# Patient Record
Sex: MALE | Race: WHITE | Employment: FULL TIME | URBAN - METROPOLITAN AREA
[De-identification: names, ages, dates, MRNs, and addresses within clinical notes are randomized per-mention and may not be internally consistent; named-entity substitution may affect disease eponyms.]

---

## 2017-06-08 ENCOUNTER — ALLSCRIPTS OFFICE VISIT (OUTPATIENT)
Dept: OTHER | Facility: OTHER | Age: 27
End: 2017-06-08

## 2018-01-14 VITALS
BODY MASS INDEX: 26.18 KG/M2 | TEMPERATURE: 98.9 F | WEIGHT: 204 LBS | SYSTOLIC BLOOD PRESSURE: 128 MMHG | HEIGHT: 74 IN | DIASTOLIC BLOOD PRESSURE: 84 MMHG

## 2018-03-19 ENCOUNTER — OFFICE VISIT (OUTPATIENT)
Dept: FAMILY MEDICINE CLINIC | Facility: CLINIC | Age: 28
End: 2018-03-19
Payer: COMMERCIAL

## 2018-03-19 VITALS
BODY MASS INDEX: 27.46 KG/M2 | WEIGHT: 214 LBS | DIASTOLIC BLOOD PRESSURE: 76 MMHG | TEMPERATURE: 98.1 F | SYSTOLIC BLOOD PRESSURE: 110 MMHG | HEART RATE: 68 BPM | HEIGHT: 74 IN

## 2018-03-19 DIAGNOSIS — L42 PITYRIASIS ROSEA: ICD-10-CM

## 2018-03-19 DIAGNOSIS — L30.9 DERMATITIS: Primary | ICD-10-CM

## 2018-03-19 PROCEDURE — 99213 OFFICE O/P EST LOW 20 MIN: CPT | Performed by: FAMILY MEDICINE

## 2018-03-19 RX ORDER — DEXTROAMPHETAMINE SACCHARATE, AMPHETAMINE ASPARTATE MONOHYDRATE, DEXTROAMPHETAMINE SULFATE AND AMPHETAMINE SULFATE 2.5; 2.5; 2.5; 2.5 MG/1; MG/1; MG/1; MG/1
CAPSULE, EXTENDED RELEASE ORAL
COMMUNITY
Start: 2012-11-26 | End: 2020-07-15 | Stop reason: ALTCHOICE

## 2018-03-19 RX ORDER — PREDNISONE 10 MG/1
10 TABLET ORAL
Qty: 21 TABLET | Refills: 0 | Status: SHIPPED | OUTPATIENT
Start: 2018-03-19 | End: 2020-07-15 | Stop reason: ALTCHOICE

## 2018-03-19 NOTE — PROGRESS NOTES
Assessment/Plan:    Dermatitis  The patient was placed on prednisone 10 mg 1 tablet 3 times a day for 1 week and he was instructed to  Claritin 10 mg 1 daily  He may also use Benadryl 50 mg HS if needed  Pityriasis rosea  [place] patch located upper left abdomen       Diagnoses and all orders for this visit:    Dermatitis  -     predniSONE 10 mg tablet; Take 1 tablet (10 mg total) by mouth 3 (three) times daily after meals    Pityriasis rosea    Other orders  -     amphetamine-dextroamphetamine (ADDERALL XR) 10 MG 24 hr capsule; Take by mouth          Subjective:      Patient ID: Ghazal Mcgill is a 24 y o  male  CC:  Rash on torso - chest and back  Started Thursday  Does not itch or burn  -  lsh    HPI:  This is a 82-year-old male who comes in with a find circular erythematous rash located on his trunk and chest   He had a herald patch located on the left side of his upper abdomen  It is not itching or burning  And it is located on both sides of midline  He developed this 5 days ago  He has not used any medication for this  He denies having any kind of respiratory illness prior to the rash  His blood pressure is 110/76 and his temperature is 98 1°  The following portions of the patient's history were reviewed and updated as appropriate: allergies, current medications, past family history, past medical history, past social history, past surgical history and problem list     Review of Systems   Constitutional: Negative  HENT: Negative  Eyes: Negative  Respiratory: Negative  Cardiovascular: Negative  Gastrointestinal: Negative  Endocrine: Negative  Genitourinary: Negative  Musculoskeletal: Negative  Skin: Positive for rash  Allergic/Immunologic: Negative  Neurological: Negative  Hematological: Negative  Psychiatric/Behavioral: Negative            Vitals:    03/19/18 1430   BP: 110/76   BP Location: Left arm   Patient Position: Sitting   Cuff Size: Large Pulse: 68   Temp: 98 1 °F (36 7 °C)   TempSrc: Oral   Weight: 97 1 kg (214 lb)   Height: 6' 1 8" (1 875 m)   Objective:      /76 (BP Location: Left arm, Patient Position: Sitting, Cuff Size: Large)   Pulse 68   Temp 98 1 °F (36 7 °C) (Oral)   Ht 6' 1 8" (1 875 m)   Wt 97 1 kg (214 lb)   BMI 27 63 kg/m²          Physical Exam   Constitutional: He is oriented to person, place, and time  He appears well-developed and well-nourished  HENT:   Head: Normocephalic  Right Ear: External ear normal    Left Ear: External ear normal    Mouth/Throat: Oropharynx is clear and moist    Eyes: Conjunctivae and EOM are normal  Pupils are equal, round, and reactive to light  Neck: Normal range of motion  Neck supple  Cardiovascular: Normal rate, regular rhythm and normal heart sounds  Pulmonary/Chest: Effort normal and breath sounds normal    Abdominal: Soft  Bowel sounds are normal    Musculoskeletal: Normal range of motion  Neurological: He is alert and oriented to person, place, and time  Skin: Skin is warm  Rash noted  Circular rash located on his back and abdomen with a herald patch located left side of upper abdomen  Psychiatric: He has a normal mood and affect   His behavior is normal  Judgment and thought content normal

## 2018-03-19 NOTE — ASSESSMENT & PLAN NOTE
[place] patch located upper left abdomen  Patient was advised to watch his alcohol intake while on prednisone

## 2018-03-19 NOTE — ASSESSMENT & PLAN NOTE
The patient was placed on prednisone 10 mg 1 tablet 3 times a day for 1 week and he was instructed to  Claritin 10 mg 1 daily  He may also use Benadryl 50 mg HS if needed

## 2020-07-15 ENCOUNTER — OFFICE VISIT (OUTPATIENT)
Dept: FAMILY MEDICINE CLINIC | Facility: CLINIC | Age: 30
End: 2020-07-15
Payer: COMMERCIAL

## 2020-07-15 VITALS
DIASTOLIC BLOOD PRESSURE: 86 MMHG | RESPIRATION RATE: 18 BRPM | SYSTOLIC BLOOD PRESSURE: 138 MMHG | TEMPERATURE: 98 F | BODY MASS INDEX: 26.44 KG/M2 | WEIGHT: 206 LBS | HEIGHT: 74 IN

## 2020-07-15 DIAGNOSIS — R59.0 POSTERIOR AURICULAR LYMPHADENOPATHY: ICD-10-CM

## 2020-07-15 DIAGNOSIS — Z00.00 HEALTH CARE MAINTENANCE: Primary | ICD-10-CM

## 2020-07-15 DIAGNOSIS — Z20.828 EXPOSURE TO SARS-ASSOCIATED CORONAVIRUS: ICD-10-CM

## 2020-07-15 PROCEDURE — 99395 PREV VISIT EST AGE 18-39: CPT | Performed by: PHYSICIAN ASSISTANT

## 2020-07-15 PROCEDURE — 3008F BODY MASS INDEX DOCD: CPT | Performed by: PHYSICIAN ASSISTANT

## 2020-07-15 NOTE — PATIENT INSTRUCTIONS
Assessment/plan:  1  Health maintenance-healthy appearing 72-year-old gentleman  Recommend routine screening blood work  Patient declines STD testing  Will follow-up with labs as needed  2  Left postauricular mass-possibly adenopathy  This has been persistent and enlarging  Recommend ultrasound to try to determine consistency  3  Diarrhea-patient has had some diarrhea after a wedding that he was to recently  His girlfriend also has symptoms and they will be visiting somebody with health conditions that they want to get COVID testing  Swab will be ordered at [facility]  Patient was cautioned this may take 5-7 days for a result

## 2020-07-15 NOTE — PROGRESS NOTES
Assessment and Plan:  Patient Instructions   Assessment/plan:  1  Health maintenance-healthy appearing 60-year-old gentleman  Recommend routine screening blood work  Patient declines STD testing  Will follow-up with labs as needed  2  Left postauricular mass-possibly adenopathy  This has been persistent and enlarging  Recommend ultrasound to try to determine consistency  3  Diarrhea-patient has had some diarrhea after a wedding that he was to recently  His girlfriend also has symptoms and they will be visiting somebody with health conditions that they want to get COVID testing  Swab will be ordered at [facility]  Patient was cautioned this may take 5-7 days for a result  Problem List Items Addressed This Visit     None      Visit Diagnoses     Health care maintenance    -  Primary    Relevant Orders    CBC and differential    Comprehensive metabolic panel    Lipid Panel with Direct LDL reflex    TSH, 3rd generation with Free T4 reflex    Exposure to SARS-associated coronavirus        Relevant Orders    MISC COVID-19 TEST- Collected at [facility]    Posterior auricular lymphadenopathy        Relevant Orders    US head neck soft tissue                 Diagnoses and all orders for this visit:    Health care maintenance  -     CBC and differential  -     Comprehensive metabolic panel  -     Lipid Panel with Direct LDL reflex  -     TSH, 3rd generation with Free T4 reflex    Exposure to SARS-associated coronavirus  -     MISC COVID-19 TEST- Collected at [facility]; Future    Posterior auricular lymphadenopathy  -     US head neck soft tissue; Future              Subjective:      Patient ID: Ginna Sears is a 31 y o  male  CC:    Chief Complaint   Patient presents with    Annual Exam     check up       HPI:    HPI:  This is a 60-year-old gentleman that presents to the office for general health maintenance check    He has been feeling well for the most part but he does mention that he recently was at a wedding and has had some diarrhea shortly after  He does not have any other symptoms present  His girlfriend and he will be visiting somebody with sickle cell disease in the near future and he wants to make sure that he is not COVID positive prior to visiting them  He is also interested in getting routine blood work and he does mention a mass behind his left ear which has been persistent for over a year  It seems to be getting larger in time  He has occasionally had some pains associated with it  The following portions of the patient's history were reviewed and updated as appropriate: allergies, current medications, past family history, past medical history, past social history, past surgical history and problem list       Review of Systems   Constitutional: Negative for chills, fatigue and fever  HENT: Negative for congestion, ear pain and sinus pressure  Eyes: Negative for visual disturbance  Respiratory: Negative for cough, chest tightness and shortness of breath  Cardiovascular: Negative for chest pain and palpitations  Gastrointestinal: Positive for diarrhea  Negative for nausea and vomiting  Endocrine: Negative for polyuria  Genitourinary: Negative for dysuria and frequency  Musculoskeletal: Negative for arthralgias and myalgias  Skin: Negative for pallor and rash  Neurological: Negative for dizziness, weakness, light-headedness, numbness and headaches  Psychiatric/Behavioral: Negative for agitation, behavioral problems and sleep disturbance  All other systems reviewed and are negative  Data to review:       Objective:    Vitals:    07/15/20 1046   BP: 138/86   BP Location: Left arm   Patient Position: Sitting   Cuff Size: Standard   Resp: 18   Temp: 98 °F (36 7 °C)   TempSrc: Tympanic   Weight: 93 4 kg (206 lb)   Height: 6' 2" (1 88 m)        Physical Exam   Constitutional: He is oriented to person, place, and time   He appears well-developed and well-nourished  No distress  HENT:   Head: Normocephalic and atraumatic  Right Ear: External ear normal    Left Ear: External ear normal    Nose: Nose normal    Mouth/Throat: Oropharynx is clear and moist  No oropharyngeal exudate  Eyes: Pupils are equal, round, and reactive to light  Conjunctivae and EOM are normal    Neck: Normal range of motion  Neck supple  No tracheal deviation present  No thyromegaly present  Cardiovascular: Normal rate, regular rhythm and normal heart sounds  Exam reveals no friction rub  No murmur heard  Pulmonary/Chest: Effort normal and breath sounds normal  No respiratory distress  He has no wheezes  He has no rales  Abdominal: Soft  Bowel sounds are normal  He exhibits no distension  There is no tenderness  There is no rebound and no guarding  Musculoskeletal: Normal range of motion  He exhibits no edema or tenderness  Lymphadenopathy:     He has no cervical adenopathy  Neurological: He is alert and oriented to person, place, and time  No cranial nerve deficit  Coordination normal    Skin: Skin is warm and dry  No rash noted  No erythema  Psychiatric: He has a normal mood and affect  His behavior is normal  Thought content normal    Nursing note and vitals reviewed  BMI Counseling: Body mass index is 26 45 kg/m²  The BMI is above normal  Nutrition recommendations include decreasing portion sizes  Exercise recommendations include exercising 3-5 times per week

## 2020-07-16 ENCOUNTER — HOSPITAL ENCOUNTER (OUTPATIENT)
Dept: ULTRASOUND IMAGING | Facility: HOSPITAL | Age: 30
Discharge: HOME | End: 2020-07-16
Payer: COMMERCIAL

## 2020-07-16 DIAGNOSIS — Z20.828 EXPOSURE TO SARS-ASSOCIATED CORONAVIRUS: ICD-10-CM

## 2020-07-16 DIAGNOSIS — R59.0 POSTERIOR AURICULAR LYMPHADENOPATHY: ICD-10-CM

## 2020-07-16 PROCEDURE — U0003 INFECTIOUS AGENT DETECTION BY NUCLEIC ACID (DNA OR RNA); SEVERE ACUTE RESPIRATORY SYNDROME CORONAVIRUS 2 (SARS-COV-2) (CORONAVIRUS DISEASE [COVID-19]), AMPLIFIED PROBE TECHNIQUE, MAKING USE OF HIGH THROUGHPUT TECHNOLOGIES AS DESCRIBED BY CMS-2020-01-R: HCPCS

## 2020-07-16 PROCEDURE — 76536 US EXAM OF HEAD AND NECK: CPT

## 2020-07-22 LAB — SARS-COV-2 RNA SPEC QL NAA+PROBE: NOT DETECTED

## 2020-12-14 ENCOUNTER — OFFICE VISIT (OUTPATIENT)
Dept: FAMILY MEDICINE CLINIC | Facility: CLINIC | Age: 30
End: 2020-12-14
Payer: COMMERCIAL

## 2020-12-14 VITALS
WEIGHT: 209 LBS | TEMPERATURE: 97.3 F | HEART RATE: 64 BPM | SYSTOLIC BLOOD PRESSURE: 140 MMHG | HEIGHT: 74 IN | BODY MASS INDEX: 26.82 KG/M2 | DIASTOLIC BLOOD PRESSURE: 80 MMHG

## 2020-12-14 DIAGNOSIS — F90.0 ADHD, PREDOMINANTLY INATTENTIVE TYPE: Primary | ICD-10-CM

## 2020-12-14 PROCEDURE — 99213 OFFICE O/P EST LOW 20 MIN: CPT | Performed by: PHYSICIAN ASSISTANT

## 2020-12-14 PROCEDURE — 1036F TOBACCO NON-USER: CPT | Performed by: PHYSICIAN ASSISTANT

## 2020-12-14 PROCEDURE — 3008F BODY MASS INDEX DOCD: CPT | Performed by: PHYSICIAN ASSISTANT

## 2020-12-14 RX ORDER — DEXTROAMPHETAMINE SACCHARATE, AMPHETAMINE ASPARTATE MONOHYDRATE, DEXTROAMPHETAMINE SULFATE AND AMPHETAMINE SULFATE 2.5; 2.5; 2.5; 2.5 MG/1; MG/1; MG/1; MG/1
10 CAPSULE, EXTENDED RELEASE ORAL EVERY MORNING
Qty: 30 CAPSULE | Refills: 0 | Status: SHIPPED | OUTPATIENT
Start: 2020-12-14 | End: 2021-03-08 | Stop reason: SDUPTHER

## 2021-01-18 DIAGNOSIS — F90.0 ADHD, PREDOMINANTLY INATTENTIVE TYPE: ICD-10-CM

## 2021-01-18 RX ORDER — DEXTROAMPHETAMINE SACCHARATE, AMPHETAMINE ASPARTATE MONOHYDRATE, DEXTROAMPHETAMINE SULFATE AND AMPHETAMINE SULFATE 2.5; 2.5; 2.5; 2.5 MG/1; MG/1; MG/1; MG/1
10 CAPSULE, EXTENDED RELEASE ORAL EVERY MORNING
Qty: 30 CAPSULE | Refills: 0 | OUTPATIENT
Start: 2021-01-18

## 2021-01-18 NOTE — TELEPHONE ENCOUNTER
Pt called in because he needs a refill for   adderall xr 10 mg    please send to [facility] on [address] please

## 2021-01-19 NOTE — TELEPHONE ENCOUNTER
He needs to have urine toxicology completed before it can be renewed  This was ordered at his visit last month and he was notified

## 2021-02-08 ENCOUNTER — TELEPHONE (OUTPATIENT)
Dept: FAMILY MEDICINE CLINIC | Facility: CLINIC | Age: 31
End: 2021-02-08

## 2021-02-08 DIAGNOSIS — F90.0 ADHD, PREDOMINANTLY INATTENTIVE TYPE: Primary | ICD-10-CM

## 2021-02-08 LAB
ACETONE SERPL-MCNC: NORMAL MG/DL
ETHANOL BLD GC-MCNC: NORMAL MG/ML
ISOPROPANOL SERPL-MCNC: NORMAL MG/DL
SL AMB DRUG TEST, GENERAL TOXICOLOGY: NORMAL
SL AMB METHANOL: NORMAL

## 2021-02-08 NOTE — TELEPHONE ENCOUNTER
Test was performed at [facility] and when a switch to a [facility] specific order it did not contain any of the things that I need in the urine  I will place another order for [facility] specific lab order  Please see if they can add on to the urine sample given or if they will need a new sample

## 2021-02-10 NOTE — TELEPHONE ENCOUNTER
Pt aware and states he will just go to [facility] lab and use the order from 12/2020

## 2021-03-05 ENCOUNTER — LAB (OUTPATIENT)
Dept: LAB | Facility: CLINIC | Age: 31
End: 2021-03-05
Payer: COMMERCIAL

## 2021-03-05 PROCEDURE — 80307 DRUG TEST PRSMV CHEM ANLYZR: CPT | Performed by: PHYSICIAN ASSISTANT

## 2021-03-06 LAB
AMPHETAMINES UR QL SCN: NEGATIVE NG/ML
BARBITURATES UR QL SCN: NEGATIVE NG/ML
BENZODIAZ UR QL: NEGATIVE NG/ML
BZE UR QL: NEGATIVE NG/ML
CANNABINOIDS UR QL SCN: NEGATIVE NG/ML
METHADONE UR QL SCN: NEGATIVE NG/ML
OPIATES UR QL: NEGATIVE NG/ML
PCP UR QL: NEGATIVE NG/ML
PROPOXYPH UR QL SCN: NEGATIVE NG/ML

## 2021-03-08 ENCOUNTER — TELEPHONE (OUTPATIENT)
Dept: FAMILY MEDICINE CLINIC | Facility: CLINIC | Age: 31
End: 2021-03-08

## 2021-03-08 DIAGNOSIS — F90.0 ADHD, PREDOMINANTLY INATTENTIVE TYPE: ICD-10-CM

## 2021-03-08 RX ORDER — DEXTROAMPHETAMINE SACCHARATE, AMPHETAMINE ASPARTATE MONOHYDRATE, DEXTROAMPHETAMINE SULFATE AND AMPHETAMINE SULFATE 2.5; 2.5; 2.5; 2.5 MG/1; MG/1; MG/1; MG/1
10 CAPSULE, EXTENDED RELEASE ORAL EVERY MORNING
Qty: 30 CAPSULE | Refills: 0 | Status: SHIPPED | OUTPATIENT
Start: 2021-03-08 | End: 2021-06-23 | Stop reason: SDUPTHER

## 2021-04-22 ENCOUNTER — TELEPHONE (OUTPATIENT)
Dept: FAMILY MEDICINE CLINIC | Facility: CLINIC | Age: 31
End: 2021-04-22

## 2021-04-22 NOTE — TELEPHONE ENCOUNTER
Pt called stating he is traveling out of the country and needs "proof of recovery" to re-enter the country  He's just looking for some kind of documentation that he did have covid and is now recovered  Any questions, please call the pt at [phone]  Thank you!

## 2021-04-23 ENCOUNTER — TELEMEDICINE (OUTPATIENT)
Dept: FAMILY MEDICINE CLINIC | Facility: CLINIC | Age: 31
End: 2021-04-23
Payer: COMMERCIAL

## 2021-04-23 DIAGNOSIS — U07.1 COVID-19: Primary | ICD-10-CM

## 2021-04-23 PROCEDURE — 99212 OFFICE O/P EST SF 10 MIN: CPT | Performed by: PHYSICIAN ASSISTANT

## 2021-04-23 PROCEDURE — 1036F TOBACCO NON-USER: CPT | Performed by: PHYSICIAN ASSISTANT

## 2021-04-23 NOTE — PROGRESS NOTES
COVID-19 Outpatient Progress Note    Assessment/Plan:    Problem List Items Addressed This Visit        Other    COVID-19 - Primary         Disposition:     Assessment/plan:  1  COVID-19 infection-patient did test positive at [facility] clinic on the 16th of March  His symptoms lasted about a week and have resolved completely  It has been about 4 weeks since he has exhibited any symptoms and he is currently beyond any required quarantine  He is not infectious to others and is safe to travel without restriction  Note has been created for the patient  Would encourage him to get the vaccine, but he may choose to wait 3 months after his infection to limit risk of reaction  I have spent 15 minutes directly with the patient  Encounter provider Wesley Woo PA-C    Provider located at [address] [place] [id number]    Recent Visits  Date Type Provider Dept   04/22/21 Telephone [facility]   Showing recent visits within past 7 days and meeting all other requirements     Today's Visits  Date Type Provider Dept   04/23/21 Telemedicine [facility]   Showing today's visits and meeting all other requirements     Future Appointments  No visits were found meeting these conditions  Showing future appointments within next 150 days and meeting all other requirements      This virtual check-in was done via [employer] and patient was informed that this is not a secure, HIPAA-compliant platform  He agrees to proceed  Patient agrees to participate in a virtual check in via telephone or video visit instead of presenting to the office to address urgent/immediate medical needs  Patient is aware this is a billable service  After connecting through [facility], the patient was identified by name and date of birth   Amy Solis was informed that this was a telemedicine visit and that the exam was being conducted confidentially over secure lines  My office door was closed  No one else was in the room  Viri Daly acknowledged consent and understanding of privacy and security of the telemedicine visit  I informed the patient that I have reviewed his record in Epic and presented the opportunity for him to ask any questions regarding the visit today  The patient agreed to participate  Subjective:   Viri Daly is a 36 y o  male who is concerned about COVID-19  Patient is currently asymptomatic  Patient denies fever, chills, fatigue, malaise, congestion, rhinorrhea, sore throat, anosmia, loss of taste, cough, shortness of breath, chest tightness, abdominal pain, nausea, vomiting, diarrhea, myalgias and headaches  Date of symptom onset: 3/16/2021    HPI:  This is a 53-year-old gentleman that presents via virtual video visit using [employer] duo platform  He did test positive at [facility] for COVID-19 infection on the 3/16/2021  He had symptoms for about a week including sinus dryness, decreased smell and taste, and some diarrhea  He was taking over-the-counter vitamin-C, zinc, and vitamin-D and his symptoms seem to resolve without any complication  He has been symptom free for about 4 weeks now  He would like to travel to [facility] next week and request note that he is safe to travel at this time  Lab Results   Component Value Date    SARSCOV2 Lumiradx Sars-Cov-2 AG Test 03/23/2021    SARSCOV2 Positive (A) 03/16/2021     Past Medical History:   Diagnosis Date    Closed fracture of distal phalanx of left ring finger     Last assessed 6/13/2013     Closed nondisplaced fracture of scaphoid bone     Sebaceous cyst     Last assessed 3/29/2013      History reviewed  No pertinent surgical history    Current Outpatient Medications   Medication Sig Dispense Refill    amphetamine-dextroamphetamine (ADDERALL XR) 10 MG 24 hr capsule Take 1 capsule (10 mg total) by mouth every morningMax Daily Amount: 10 mg 30 capsule 0     No current facility-administered medications for this visit  No Known Allergies    Review of Systems   Constitutional: Negative for chills, fatigue and fever  HENT: Negative for congestion, rhinorrhea and sore throat  Respiratory: Negative for cough, chest tightness and shortness of breath  Gastrointestinal: Negative for abdominal pain, diarrhea, nausea and vomiting  Musculoskeletal: Negative for myalgias  Neurological: Negative for headaches  Objective: There were no vitals filed for this visit  Physical Exam  Constitutional:       General: He is not in acute distress  Appearance: He is well-developed  HENT:      Head: Normocephalic and atraumatic  Eyes:      Conjunctiva/sclera: Conjunctivae normal    Neck:      Musculoskeletal: Normal range of motion  Pulmonary:      Effort: Pulmonary effort is normal    Skin:     Findings: No rash  Neurological:      Mental Status: He is alert and oriented to person, place, and time  VIRTUAL VISIT DISCLAIMER    David Delgadillo acknowledges that he has consented to an online visit or consultation  He understands that the online visit is based solely on information provided by him, and that, in the absence of a face-to-face physical evaluation by the physician, the diagnosis he receives is both limited and provisional in terms of accuracy and completeness  This is not intended to replace a full medical face-to-face evaluation by the physician  David Delgadillo understands and accepts these terms

## 2021-04-23 NOTE — TELEPHONE ENCOUNTER
I am sorry, I do not find anything in [facility] or [facility] chart that shows that he ever tested positive or was treated or followed and cleared by a healthcare provider  I would recommend he contact where ever he had the testing performed

## 2021-04-23 NOTE — LETTER
April 23, 2021     Patient: Waylon Valladares   YOB: 1990   Date of Visit: 4/23/2021       To Whom it May Concern:    Waylon Valladares is under my professional care  He was seen on 4/23/2021  He tested positive for COVID-19 infection on the 16th of March,2021 and has exceeded all quarantine requirements  He has been asymptomatic for almost 4 weeks now  He currently exhibits no sign of COVID infection and is no longer considered infectious to others and may travel without restriction  If you have any questions or concerns, please don't hesitate to call           Sincerely,          Paticia Merlin, PA-C        CC: No Recipients

## 2021-04-23 NOTE — TELEPHONE ENCOUNTER
Pt on your schedule for 1:30pm and lab for covid in the [facility] is in please refresh  [facility]  Results are in

## 2021-06-23 DIAGNOSIS — F90.0 ADHD, PREDOMINANTLY INATTENTIVE TYPE: ICD-10-CM

## 2021-06-23 RX ORDER — DEXTROAMPHETAMINE SACCHARATE, AMPHETAMINE ASPARTATE MONOHYDRATE, DEXTROAMPHETAMINE SULFATE AND AMPHETAMINE SULFATE 2.5; 2.5; 2.5; 2.5 MG/1; MG/1; MG/1; MG/1
10 CAPSULE, EXTENDED RELEASE ORAL EVERY MORNING
Qty: 30 CAPSULE | Refills: 0 | Status: SHIPPED | OUTPATIENT
Start: 2021-06-23 | End: 2021-07-30 | Stop reason: SDUPTHER

## 2021-07-08 ENCOUNTER — TELEPHONE (OUTPATIENT)
Dept: FAMILY MEDICINE CLINIC | Facility: CLINIC | Age: 31
End: 2021-07-08

## 2021-07-14 NOTE — TELEPHONE ENCOUNTER
Patient called back today regarding his bill  He would like to speak to you today   You may reach him at [phone]

## 2021-07-20 NOTE — TELEPHONE ENCOUNTER
PATIENT CALLED IN WANTED TO CHECK ON STATUS OF BILLING ISSUE FOR TOXICOLOGY SCREENING COMPLETED A FEW MONTHS AGO, ADVISED TO PT CURRENTLY BEING WORKED ON BY PRACTICE ADMINISTRATOR & HE WILL BE REACH IF WE HAVE ANY QUESTIONS OR FEEDBACK FOR HIM [phone]

## 2021-07-30 DIAGNOSIS — F90.0 ADHD, PREDOMINANTLY INATTENTIVE TYPE: ICD-10-CM

## 2021-07-30 RX ORDER — DEXTROAMPHETAMINE SACCHARATE, AMPHETAMINE ASPARTATE MONOHYDRATE, DEXTROAMPHETAMINE SULFATE AND AMPHETAMINE SULFATE 2.5; 2.5; 2.5; 2.5 MG/1; MG/1; MG/1; MG/1
10 CAPSULE, EXTENDED RELEASE ORAL EVERY MORNING
Qty: 30 CAPSULE | Refills: 0 | Status: SHIPPED | OUTPATIENT
Start: 2021-07-30 | End: 2021-10-29 | Stop reason: SDUPTHER

## 2021-07-30 NOTE — TELEPHONE ENCOUNTER
We did telemedicine appointment in April  I will renew for 30 tablets but recommend office visit in the next month

## 2021-07-30 NOTE — TELEPHONE ENCOUNTER
MS, Pt is requesting refill for his Aderrall 10 mg but it looks like pt Is due for an appt,May we refill ?

## 2021-10-29 ENCOUNTER — OFFICE VISIT (OUTPATIENT)
Dept: FAMILY MEDICINE CLINIC | Facility: CLINIC | Age: 31
End: 2021-10-29
Payer: COMMERCIAL

## 2021-10-29 VITALS
HEIGHT: 74 IN | BODY MASS INDEX: 26.54 KG/M2 | WEIGHT: 206.8 LBS | DIASTOLIC BLOOD PRESSURE: 82 MMHG | HEART RATE: 64 BPM | SYSTOLIC BLOOD PRESSURE: 122 MMHG

## 2021-10-29 DIAGNOSIS — F90.0 ADHD, PREDOMINANTLY INATTENTIVE TYPE: ICD-10-CM

## 2021-10-29 PROCEDURE — 99213 OFFICE O/P EST LOW 20 MIN: CPT | Performed by: PHYSICIAN ASSISTANT

## 2021-10-29 RX ORDER — DEXTROAMPHETAMINE SACCHARATE, AMPHETAMINE ASPARTATE MONOHYDRATE, DEXTROAMPHETAMINE SULFATE AND AMPHETAMINE SULFATE 2.5; 2.5; 2.5; 2.5 MG/1; MG/1; MG/1; MG/1
10 CAPSULE, EXTENDED RELEASE ORAL EVERY MORNING
Qty: 30 CAPSULE | Refills: 0 | Status: SHIPPED | OUTPATIENT
Start: 2021-10-29 | End: 2022-03-09 | Stop reason: SDUPTHER

## 2022-03-09 ENCOUNTER — OFFICE VISIT (OUTPATIENT)
Dept: FAMILY MEDICINE CLINIC | Facility: CLINIC | Age: 32
End: 2022-03-09
Payer: COMMERCIAL

## 2022-03-09 VITALS
BODY MASS INDEX: 26.71 KG/M2 | TEMPERATURE: 96.7 F | WEIGHT: 208.13 LBS | HEIGHT: 74 IN | SYSTOLIC BLOOD PRESSURE: 128 MMHG | DIASTOLIC BLOOD PRESSURE: 80 MMHG

## 2022-03-09 DIAGNOSIS — F90.0 ADHD, PREDOMINANTLY INATTENTIVE TYPE: ICD-10-CM

## 2022-03-09 DIAGNOSIS — L73.9 FOLLICULITIS: Primary | ICD-10-CM

## 2022-03-09 PROCEDURE — 99214 OFFICE O/P EST MOD 30 MIN: CPT | Performed by: PHYSICIAN ASSISTANT

## 2022-03-09 PROCEDURE — 3008F BODY MASS INDEX DOCD: CPT | Performed by: PHYSICIAN ASSISTANT

## 2022-03-09 PROCEDURE — 1036F TOBACCO NON-USER: CPT | Performed by: PHYSICIAN ASSISTANT

## 2022-03-09 RX ORDER — KETOCONAZOLE 20 MG/G
CREAM TOPICAL DAILY
Qty: 60 G | Refills: 0 | Status: SHIPPED | OUTPATIENT
Start: 2022-03-09

## 2022-03-09 RX ORDER — CEPHALEXIN 500 MG/1
500 CAPSULE ORAL EVERY 8 HOURS SCHEDULED
Qty: 30 CAPSULE | Refills: 0 | Status: SHIPPED | OUTPATIENT
Start: 2022-03-09 | End: 2022-03-19

## 2022-03-09 RX ORDER — DEXTROAMPHETAMINE SACCHARATE, AMPHETAMINE ASPARTATE MONOHYDRATE, DEXTROAMPHETAMINE SULFATE AND AMPHETAMINE SULFATE 2.5; 2.5; 2.5; 2.5 MG/1; MG/1; MG/1; MG/1
10 CAPSULE, EXTENDED RELEASE ORAL EVERY MORNING
Qty: 30 CAPSULE | Refills: 0 | Status: SHIPPED | OUTPATIENT
Start: 2022-03-09

## 2022-03-09 NOTE — PROGRESS NOTES
Assessment and Plan:  Patient Instructions    Assessment/plan:  1  Folliculitis-possibly fungal versus bacterial   Would recommend starting on Keflex 500 mg 3 times daily for 10 days  He will also be given Nizoral cream which she can apply twice daily to the affected area  If symptoms are not significantly better in 2 weeks recommend further evaluation by dermatology  Patient verbalizes understanding and agreement with plan  2   ADHD- patient is stable on Adderall  Continues to use it appropriately  PDMP website accessed  Problem List Items Addressed This Visit        Other    ADHD, predominantly inattentive type    Relevant Medications    amphetamine-dextroamphetamine (ADDERALL XR) 10 MG 24 hr capsule      Other Visit Diagnoses     Folliculitis    -  Primary    Relevant Medications    cephalexin (KEFLEX) 500 mg capsule    ketoconazole (NIZORAL) 2 % cream                 Diagnoses and all orders for this visit:    Folliculitis  -     cephalexin (KEFLEX) 500 mg capsule; Take 1 capsule (500 mg total) by mouth every 8 (eight) hours for 10 days  -     ketoconazole (NIZORAL) 2 % cream; Apply topically daily    ADHD, predominantly inattentive type  -     amphetamine-dextroamphetamine (ADDERALL XR) 10 MG 24 hr capsule; Take 1 capsule (10 mg total) by mouth every morning Max Daily Amount: 10 mg              Subjective:      Patient ID: Vi Olmos is a 36 y o  male  CC:    Chief Complaint   Patient presents with    Rash     patch on right thigh, pt states itchy and red x 6 months  mgb       HPI:     HPI:  This is a 33-year-old gentleman that presents to the office with concerns over rash on his right lateral hip / thigh area that has been present for about 6 months  He does feel that it started with a smaller area and gradually spread into a larger area  He also notes that he has had a similar rash on the posterior arms for quite some time  Rash does itch a bit    He has been trying some over-the-counter Benadryl which has helped with the itch but not resolve the rash  He has also tried Lotrimin for about 4 days without much success  The following portions of the patient's history were reviewed and updated as appropriate: allergies, current medications, past family history, past medical history, past social history, past surgical history and problem list       Review of Systems   Constitutional: Negative for chills, fatigue and fever  HENT: Negative for congestion, ear pain and sinus pressure  Eyes: Negative for visual disturbance  Respiratory: Negative for cough, chest tightness and shortness of breath  Cardiovascular: Negative for chest pain and palpitations  Gastrointestinal: Negative for diarrhea, nausea and vomiting  Endocrine: Negative for polyuria  Genitourinary: Negative for dysuria and frequency  Musculoskeletal: Negative for arthralgias and myalgias  Skin: Positive for rash  Negative for pallor  Neurological: Negative for dizziness, weakness, light-headedness, numbness and headaches  Psychiatric/Behavioral: Negative for agitation, behavioral problems and sleep disturbance  All other systems reviewed and are negative  Data to review:       Objective:    Vitals:    03/09/22 0831   BP: 128/80   BP Location: Left arm   Patient Position: Sitting   Cuff Size: Large   Temp: (!) 96 7 °F (35 9 °C)   TempSrc: Temporal   Weight: 94 4 kg (208 lb 2 oz)   Height: 6' 2" (1 88 m)        Physical Exam  Constitutional:       General: He is not in acute distress  Appearance: He is well-developed  HENT:      Head: Normocephalic and atraumatic  Right Ear: Tympanic membrane normal       Left Ear: Tympanic membrane normal    Eyes:      Conjunctiva/sclera: Conjunctivae normal    Cardiovascular:      Rate and Rhythm: Normal rate and regular rhythm  Pulmonary:      Effort: Pulmonary effort is normal    Abdominal:      General: Abdomen is flat   Bowel sounds are normal  There is no distension  Palpations: Abdomen is soft  There is no mass  Musculoskeletal:         General: Normal range of motion  Cervical back: Normal range of motion  Skin:     General: Skin is warm  Findings: Rash present  Comments:   Patient has follicular rash of the right lateral hip area which is approximately 10 cm x 12 cm  Area is nontender  Patient also has area of the posterior right arm which has similar infected follicles which are raised, slightly rough to the touch, and erythematous  Neurological:      Mental Status: He is alert and oriented to person, place, and time  Psychiatric:         Mood and Affect: Mood normal            BMI Counseling: Body mass index is 26 72 kg/m²  The BMI is above normal  Nutrition recommendations include decreasing portion sizes  Exercise recommendations include exercising 3-5 times per week  Rationale for BMI follow-up plan is due to patient being overweight or obese

## 2022-03-09 NOTE — PATIENT INSTRUCTIONS
Assessment/plan:  1  Folliculitis-possibly fungal versus bacterial   Would recommend starting on Keflex 500 mg 3 times daily for 10 days  He will also be given Nizoral cream which she can apply twice daily to the affected area  If symptoms are not significantly better in 2 weeks recommend further evaluation by dermatology  Patient verbalizes understanding and agreement with plan  2   ADHD- patient is stable on Adderall  Continues to use it appropriately  PDMP website accessed

## 2023-08-11 ENCOUNTER — TELEMEDICINE (OUTPATIENT)
Dept: FAMILY MEDICINE CLINIC | Facility: CLINIC | Age: 33
End: 2023-08-11
Payer: COMMERCIAL

## 2023-08-11 DIAGNOSIS — F90.0 ADHD, PREDOMINANTLY INATTENTIVE TYPE: Primary | ICD-10-CM

## 2023-08-11 PROCEDURE — 99213 OFFICE O/P EST LOW 20 MIN: CPT | Performed by: PHYSICIAN ASSISTANT

## 2023-08-11 RX ORDER — DEXTROAMPHETAMINE SACCHARATE, AMPHETAMINE ASPARTATE, DEXTROAMPHETAMINE SULFATE AND AMPHETAMINE SULFATE 2.5; 2.5; 2.5; 2.5 MG/1; MG/1; MG/1; MG/1
10 TABLET ORAL
Qty: 60 TABLET | Refills: 0 | Status: SHIPPED | OUTPATIENT
Start: 2023-08-11

## 2023-08-11 NOTE — PROGRESS NOTES
Virtual Regular Visit    Verification of patient location:    Patient is located at Home in the following state in which I hold an active license [place]      Assessment/Plan:  Patient Instructions   Assessment/plan:  1. ADHD-patient has been off of medication for about 18 months but he is going to be changing profession and looking to study some new insurance/financial exams. Previously he was on extended release Adderall but his in the does not currently for long daily use but more to be able to sit and study for 4 to 6-hour periods of time. Recommend trial of short acting Adderall 10 mg as necessary. He can use this up to twice daily so we will prescribe 60 tablets to last at least 1 month. Patient had also some query about Vyvanse but I would recommend getting EKG prior to use and if he is not doing well with Adderall we will consider this next. Patient verbalizes understanding and agreement with plan. Problem List Items Addressed This Visit        Other    ADHD, predominantly inattentive type - Primary    Relevant Medications    amphetamine-dextroamphetamine (ADDERALL, 10MG,) 10 mg tablet            Reason for visit is No chief complaint on file. Encounter provider Karina Otto PA-C    Provider located at [address] [address] [phone]      Recent Visits  No visits were found meeting these conditions. Showing recent visits within past 7 days and meeting all other requirements  Today's Visits  Date Type Provider Dept   08/11/23 Telemedicine Karina Otto PA-C [facility]   Showing today's visits and meeting all other requirements  Future Appointments  No visits were found meeting these conditions. Showing future appointments within next 150 days and meeting all other requirements       The patient was identified by name and date of birth.  Te Badillo was informed that this is a telemedicine visit and that the visit is being conducted through the [employer] He agrees to proceed. .  My office door was closed. No one else was in the room. He acknowledged consent and understanding of privacy and security of the video platform. The patient has agreed to participate and understands they can discontinue the visit at any time. Patient is aware this is a billable service. Livan Gagnon is a 34 y.o. male see HPI .      HPI: This is a 37-year-old gentleman that presents via virtual video visit using embedded epic platform. He is seen for ADHD. He had been diagnosed several years ago and was taking Adderall extended release for quite some time. 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 He then seem to settle in and not necessarily need the medication for the past 18 months or so. He is again looking at switching his job and studying for some new insurance/financial exams. He does believe that the medication is necessary to help keep his focus. Previously when he was on the extended release of the Adderall however he did not care for some of the side effects like dry mouth. His current need is to study for maybe 4 to 6 hours chunks of time but not necessarily the rest of the day. Past Medical History:   Diagnosis Date   • Closed fracture of distal phalanx of left ring finger     Last assessed 6/13/2013    • Closed nondisplaced fracture of scaphoid bone    • Sebaceous cyst     Last assessed 3/29/2013        No past surgical history on file. Current Outpatient Medications   Medication Sig Dispense Refill   • amphetamine-dextroamphetamine (ADDERALL, 10MG,) 10 mg tablet Take 1 tablet (10 mg total) by mouth 2 (two) times a day Max Daily Amount: 20 mg 60 tablet 0   • ketoconazole (NIZORAL) 2 % cream Apply topically daily 60 g 0     No current facility-administered medications for this visit.         No Known Allergies    Review of Systems   Constitutional: Negative for chills, fatigue and fever. HENT: Negative for congestion, ear pain and sinus pressure. Eyes: Negative for visual disturbance. Respiratory: Negative for cough, chest tightness and shortness of breath. Cardiovascular: Negative for chest pain and palpitations. Gastrointestinal: Negative for diarrhea, nausea and vomiting. Endocrine: Negative for polyuria. Genitourinary: Negative for dysuria and frequency. Musculoskeletal: Negative for arthralgias and myalgias. Skin: Negative for pallor and rash. Neurological: Negative for dizziness, weakness, light-headedness, numbness and headaches. Psychiatric/Behavioral: Negative for agitation, behavioral problems and sleep disturbance. All other systems reviewed and are negative. Video Exam    There were no vitals filed for this visit. Physical Exam  Vitals and nursing note reviewed. Constitutional:       General: He is not in acute distress. Appearance: He is well-developed. HENT:      Head: Normocephalic and atraumatic. Right Ear: External ear normal.      Left Ear: External ear normal.      Nose: Nose normal.      Mouth/Throat:      Pharynx: No oropharyngeal exudate. Eyes:      Conjunctiva/sclera: Conjunctivae normal.      Pupils: Pupils are equal, round, and reactive to light. Neck:      Thyroid: No thyromegaly. Trachea: No tracheal deviation. Cardiovascular:      Rate and Rhythm: Normal rate and regular rhythm. Heart sounds: Normal heart sounds. No murmur heard. No friction rub. Pulmonary:      Effort: Pulmonary effort is normal. No respiratory distress. Breath sounds: Normal breath sounds. No wheezing or rales. Abdominal:      General: Bowel sounds are normal. There is no distension. Palpations: Abdomen is soft. Tenderness: There is no abdominal tenderness. There is no guarding or rebound.    Musculoskeletal:         General: No tenderness. Normal range of motion. Cervical back: Normal range of motion and neck supple. Lymphadenopathy:      Cervical: No cervical adenopathy. Skin:     General: Skin is warm and dry. Findings: No erythema or rash. Neurological:      Mental Status: He is alert and oriented to person, place, and time. Cranial Nerves: No cranial nerve deficit. Coordination: Coordination normal.   Psychiatric:         Behavior: Behavior normal.         Thought Content:  Thought content normal.          Visit Time  Total Visit Duration: 20min

## 2023-08-11 NOTE — PATIENT INSTRUCTIONS
Assessment/plan:  1. ADHD-patient has been off of medication for about 18 months but he is going to be changing profession and looking to study some new insurance/financial exams. Previously he was on extended release Adderall but his in the does not currently for long daily use but more to be able to sit and study for 4 to 6-hour periods of time. Recommend trial of short acting Adderall 10 mg as necessary. He can use this up to twice daily so we will prescribe 60 tablets to last at least 1 month. Patient had also some query about Vyvanse but I would recommend getting EKG prior to use and if he is not doing well with Adderall we will consider this next. Patient verbalizes understanding and agreement with plan.

## 2023-10-18 DIAGNOSIS — F90.0 ADHD, PREDOMINANTLY INATTENTIVE TYPE: ICD-10-CM

## 2023-10-26 RX ORDER — DEXTROAMPHETAMINE SACCHARATE, AMPHETAMINE ASPARTATE, DEXTROAMPHETAMINE SULFATE AND AMPHETAMINE SULFATE 2.5; 2.5; 2.5; 2.5 MG/1; MG/1; MG/1; MG/1
10 TABLET ORAL
Qty: 60 TABLET | Refills: 0 | Status: SHIPPED | OUTPATIENT
Start: 2023-10-26

## 2023-10-26 NOTE — TELEPHONE ENCOUNTER
Patient called back in and stated that he wants it to go to a local pharmacy [facility] [address] please advise thank you